# Patient Record
Sex: FEMALE | Race: WHITE | NOT HISPANIC OR LATINO | Employment: PART TIME | URBAN - METROPOLITAN AREA
[De-identification: names, ages, dates, MRNs, and addresses within clinical notes are randomized per-mention and may not be internally consistent; named-entity substitution may affect disease eponyms.]

---

## 2021-05-31 ENCOUNTER — RECORDS - HEALTHEAST (OUTPATIENT)
Dept: ADMINISTRATIVE | Facility: CLINIC | Age: 51
End: 2021-05-31

## 2021-06-04 ENCOUNTER — RECORDS - HEALTHEAST (OUTPATIENT)
Dept: ADMINISTRATIVE | Facility: CLINIC | Age: 51
End: 2021-06-04

## 2021-09-01 ENCOUNTER — TRANSFERRED RECORDS (OUTPATIENT)
Dept: HEALTH INFORMATION MANAGEMENT | Facility: CLINIC | Age: 51
End: 2021-09-01

## 2021-09-27 NOTE — TELEPHONE ENCOUNTER
RECORDS RECEIVED FROM: [facility]   Appt Date: 09.30.2021   NOTES STATUS DETAILS   OFFICE NOTE from referring provider N/A    OFFICE NOTES from other specialists [facility] 08.26.2021 Kavya Lucas MD     DISCHARGE SUMMARY from hospital N/A    MEDICATION LIST [facility]    LIVER BIOSPY (IF APPLICABLE)      PATHOLOGY REPORTS  N/A    IMAGING     ENDOSCOPY (IF AVAILABLE) N/A    COLONOSCOPY (IF AVAILABLE) [facility] 11.20.2020   ULTRASOUND LIVER N/A    CT OF ABDOMEN N/A    MRI OF LIVER [facility] 09.06.2021 MR ABD W/WO IV CONT   FIBROSCAN, US ELASTOGRAPHY, FIBROSIS SCAN, MR ELASTOGRAPHY N/A    LABS     HEPATIC PANEL (LIVER PANEL) N/A    BASIC METABOLIC PANEL N/A    COMPLETE METABOLIC PANEL Internal 04.02.2019   COMPLETE BLOOD COUNT (CBC) Internal 04.02.2019   INTERNATIONAL NORMALIZED RATIO (INR) N/A    HEPATITIS C ANTIBODY N/A    HEPATITIS C VIRAL LOAD/PCR N/A    HEPATITIS C GENOTYPE N/A    HEPATITIS B SURFACE ANTIGEN N/A    HEPATITIS B SURFACE ANTIBODY N/A    HEPATITIS B DNA QUANT LEVEL N/A    HEPATITIS B CORE ANTIBODY N/A

## 2021-09-30 ENCOUNTER — PRE VISIT (OUTPATIENT)
Dept: GASTROENTEROLOGY | Facility: CLINIC | Age: 51
End: 2021-09-30

## 2021-09-30 ENCOUNTER — VIRTUAL VISIT (OUTPATIENT)
Dept: GASTROENTEROLOGY | Facility: CLINIC | Age: 51
End: 2021-09-30
Attending: INTERNAL MEDICINE
Payer: COMMERCIAL

## 2021-09-30 DIAGNOSIS — K76.0 FATTY LIVER: ICD-10-CM

## 2021-09-30 DIAGNOSIS — R16.0 LIVER MASS: Primary | ICD-10-CM

## 2021-09-30 PROCEDURE — 99204 OFFICE O/P NEW MOD 45 MIN: CPT | Mod: GT | Performed by: INTERNAL MEDICINE

## 2021-09-30 RX ORDER — LANCETS 30 GAUGE
1 EACH MISCELLANEOUS
COMMUNITY
Start: 2021-08-26 | End: 2021-09-30

## 2021-09-30 RX ORDER — BLOOD SUGAR DIAGNOSTIC
STRIP MISCELLANEOUS
COMMUNITY
Start: 2021-08-26 | End: 2021-09-30

## 2021-09-30 RX ORDER — BUSPIRONE HYDROCHLORIDE 5 MG/1
TABLET ORAL
COMMUNITY

## 2021-09-30 RX ORDER — BLOOD-GLUCOSE METER
EACH MISCELLANEOUS
COMMUNITY
Start: 2021-08-26 | End: 2021-09-30

## 2021-09-30 RX ORDER — GABAPENTIN 100 MG/1
CAPSULE ORAL
COMMUNITY

## 2021-09-30 RX ORDER — FLUOXETINE 10 MG/1
CAPSULE ORAL
COMMUNITY

## 2021-09-30 NOTE — LETTER
9/30/2021         RE: Srini Dunn  [address] [place] [address] [place] [address]        Dear Colleague,    Thank you for referring your patient, Srini Dunn, to the [facility] [place]. Please see a copy of my visit note below.    Srini is a 51 year old who is being evaluated via a billable video visit.      How would you like to obtain your AVS? MyChart  If the video visit is dropped, the invitation should be resent by: Text to cell phone: [phone]  Will anyone else be joining your video visit? No    Video Start Time: 0930  Video-Visit Details    Type of service:  Video Visit    Video End Time:0950    Originating Location (pt. Location): Home    Distant Location (provider location):  [facility] [place]     Platform used for Video Visit: [employer]    Date of Service: 9/30/2021     Referring Provider: Kavya Lucas    Subjective:            Srini Dunn is a 51 year old female presenting for evaluation of liver mass/abnormal imaging    History of Present Illness   Srini Dunn is a 51 year old female with past medical history of mild obesity, history of breast cancer in 2008, mild depression who presents with concerns of incidentally noted liver mass and fatty liver disease.    She was having issues with decreased energy over the last couple of years and has had documented episodes of hypoglycemia over the past 2 years.  Some of these been associated with altered mental status.  She establish care with endocrinology in late 2021 and underwent a CT scan as part of an evaluation for an insulinoma.  This exam incidentally demonstrated a liver mass and the patient underwent a dedicated MRI of the abdomen with and without contrast on September 3.  This demonstrated marked fatty infiltration of the liver in addition to a 3.5 cm segment 4B lesion.  Based on characteristics with imaging this was read as likely FNH versus adenoma.  Her imaging  was reviewed via telemetry consult with gastroenterology at [facility], and plan was for repeat imaging versus EUS evaluation.  The patient ultimately requested consultation with our group.    Denies a significant history of alcohol consumption.  Denies history of current nicotine use and denies a history of IV inhaled drug use.  She is not very physically active, with the majority of her activity walking her small dogs.  Denies an overt family history of liver disease but has questions of her father had liver disease and does note that her father has diabetes.    She was diagnosed with breast cancer in her left breast around 2008 underwent lumpectomy and was on tamoxifen for 5 years after    Past Medical History:  Depression  Obesity  Fatty liver disease  History of breast cancer    Surgical History:  MIKEY/BSO  Cholecystectomy    Social History:  Social History     Tobacco Use     Smoking status: Not on file   Substance Use Topics     Alcohol use: Not on file     Drug use: Not on file       Family History:  No family history on file.    Medications:  Current Outpatient Medications   Medication     busPIRone (BUSPAR) 5 MG tablet     FLUoxetine (PROZAC) 10 MG capsule     gabapentin (NEURONTIN) 100 MG capsule     No current facility-administered medications for this visit.       Review of Systems  A complete 10 point review of systems was asked and answered in the negative unless specifically commented upon in the HPI    Objective:         There were no vitals filed for this visit.  There is no height or weight on file to calculate BMI.     Physical Exam  Constitutional: Well-developed, well-nourished, in no apparent distress.  HEENT: Normocephalic. no scleral icterus.   Neck/Lymph: Normal ROM  Cardiac:  Regular rate  Skin:  No jaundice.   Musculoskeletal:  ROM intact, normal muscle bulk    Psychiatric: Normal mood and affect. Behavior is normal.  Neuro: no tremor    Labs and Diagnostic tests:  We do not have her  recent lab results    Imaging:  MRI w/wo 9/6/2021  FINDINGS: The liver demonstrates marked fatty infiltration and there is significant drop in signal on out of phase imaging. There is of a lesion seen in segment 4B of the liver on series 2.1: Image 16 measuring 3.5 x 2.7 cm. This is isointense to liver on T2-weighted scans and on the in phase T1-weighted scans. This is higher signal on the fat-suppressed T1-weighted precontrast imaging. Postcontrast there is misregistration on the subtraction imaging which makes it difficult to determine definitively whether there is any enhancement present. No evidence of restricted diffusion. On the haste imaging this is low in signal. There is a small focus of increased signal within the lesion on the T2 haste imaging series 5: Image 18 which corresponds to a small focus which dropped in signal on the out of phase imaging and could be a small focus of gross fat within the lesion.     Adenomas can have gross fat and thus this lesion could represent an atypical adenoma. Alternatively given the isointensity to the remainder of the liver on the unenhanced T1-weighted and T2-weighted scans an atypical FNH is also a possibility. Given that there is no evidence of restricted diffusion, malignancy is very unlikely. Consider follow-up exam in 3-6 months to establish stability and also consider repeat examination with Eovist in order to evaluate for focal nodular hyperplasia.     The spleen, pancreas and adrenal glands are unremarkable. Kidneys demonstrate no hydronephrosis. No renal mass. The pancreas, spleen and adrenal glands are unremarkable. Kidneys demonstrate no hydronephrosis.     IMPRESSION:   1.  There is a mass lesion in segment IVb which corresponds to the abnormality seen on recent CT. The liver is diffusely extremely fatty infiltrated and this could be a focal area of fatty sparing. That said it is difficult to be certain that there is not enhancement within the lesion due  to misregistration on the subtraction imaging. This could also represent an atypical adenoma or FNH. Malignancy is unlikely given that there is no evidence of restricted diffusion. Consider repeat exam with Eovist in order to determine whether this is an atypical FNH. Also recommend short interval follow-up in 3-6 months given that the degree of enhancement is uncertain based on misregistration on the subtraction images.    Other Result Text    Silva Woodward MD - 09/06/2021   Formatting of this note might be different from the original.   EXAM: MR ABD W/WO IV CONT   LOCATION: [facility]   DATE/TIME: 9/3/2021 4:11 PM     INDICATION: Hepatic mass seen on prior CT. Further evaluation requested.   COMPARISON: CT abdomen and pelvis 08/05/2021   TECHNIQUE: Routine MRI abdomen protocol including T1 in/out phase, diffusion, multiplane T2, and dynamic T1 with IV contrast.   CONTRAST: GADOXETATE DISODIUM 0.25 MOL/L IV SOLN 8 mL     FINDINGS: The liver demonstrates marked fatty infiltration and there is significant drop in signal on out of phase imaging. There is of a lesion seen in segment 4B of the liver on series 2.1: Image 16 measuring 3.5 x 2.7 cm. This is isointense to liver on T2-weighted scans and on the in phase T1-weighted scans. This is higher signal on the fat-suppressed T1-weighted precontrast imaging. Postcontrast there is misregistration on the subtraction imaging which makes it difficult to determine definitively whether there is any enhancement present. No evidence of restricted diffusion. On the haste imaging this is low in signal. There is a small focus of increased signal within the lesion on the T2 haste imaging series 5: Image 18 which corresponds to a small focus which dropped in signal on the out of phase imaging and could be a small focus of gross fat within the lesion.     Adenomas can have gross fat and thus this lesion could represent an atypical adenoma. Alternatively given the  isointensity to the remainder of the liver on the unenhanced T1-weighted and T2-weighted scans an atypical FNH is also a possibility. Given that there is no evidence of restricted diffusion, malignancy is very unlikely. Consider follow-up exam in 3-6 months to establish stability and also consider repeat examination with Eovist in order to evaluate for focal nodular hyperplasia.     The spleen, pancreas and adrenal glands are unremarkable. Kidneys demonstrate no hydronephrosis. No renal mass. The pancreas, spleen and adrenal glands are unremarkable. Kidneys demonstrate no hydronephrosis.       Assessment and Plan:    Abnormal Liver Imaging:    - She does not have significant risk factors for chronic liver disease, although she does have imaging evidence of fatty liver disease  - Seg 4B lesion appears most likely an FNH vs adenoma: will review in multi-disciplinary board for further review and plan    Non-Alcoholic Fatty Liver Disease  - I had a long discussion with the patient about nonalcoholic fatty liver disease (NAFLD).  We discussed how fat deposits in the liver, how this leads to inflammation, and how chronic inflammation (RUDOLPH) can ultimately lead to scarring and cirrhosis.  The long-term complications of fatty liver disease were discussed with the patient, including the increased risk of cardiovascular disease complications,the risk of developing diabetes (if not already), and variable progression to cirrhosis and end stage liver disease.  - It is important to note that liver tests alone as a screening test for RUDOLPH are not sensitive, as up to 20-30% of patients can have RUDOLPH with fibrosis despite having normal liver chemistries.     Management of NAFLD/RUDOLPH  - We spent time discussing an appropriate diet, exercise and weight loss plan.      - Recommend exercise regularly: 4+ times per week, with an average of about 45 minutes per day.      - It has shown that patients who exercise regularly can have  "improvement of insulin resistance and resolution of fatty liver disease, even if they are not able to lose weight.     - Recommend a low-carbohydrate, low-calorie diet (6393-6923 calories per day).    - An ideal weight loss plan would be to lose 7-10% of body weight over the next six months  - Recommend aggressive diabetes management: ideal goal Hgb A1c goal of =/< 7%. If possible addition of insulin sensitizing agents like metformin or liraglutide will be helpful.    - Management of cholesterol is also very important.    - The use of \"statins\" (HMG-CoA reductase medications) are an effective means of therapy and are not contra-indicated in those with abnormal liver tests OR those with cirrhosis.  The value of these medications in this population far outweigh the minor risks of abnormal liver tests.   - Goal LDL in those with RUDOLPH are < 100 mg/dL  - Consider the utility of liberalizing coffee consumption as some data that this may slow progression and reverse effects of RUDOLPH-related fibrosis.    Follow Up:  6 months     Thank you very much for the opportunity to participate in the care of this patient.  If you have any further questions, please don't hesitate to contact our office.    Thomas M. Leventhal, M.D.   of Medicine  Advanced & Transplant Hepatology  [facility]        Again, thank you for allowing me to participate in the care of your patient.        Sincerely,        Thomas M. Leventhal, MD    "

## 2021-09-30 NOTE — PROGRESS NOTES
Srini is a 51 year old who is being evaluated via a billable video visit.      How would you like to obtain your AVS? MyChart  If the video visit is dropped, the invitation should be resent by: Text to cell phone: [phone]  Will anyone else be joining your video visit? No    Video Start Time: 0930  Video-Visit Details    Type of service:  Video Visit    Video End Time:0950    Originating Location (pt. Location): Home    Distant Location (provider location):  [facility] [place]     Platform used for Video Visit: [employer]    Date of Service: 9/30/2021     Referring Provider: Kavya Lucas    Subjective:            Srini Dunn is a 51 year old female presenting for evaluation of liver mass/abnormal imaging    History of Present Illness   Srini Dunn is a 51 year old female with past medical history of mild obesity, history of breast cancer in 2008, mild depression who presents with concerns of incidentally noted liver mass and fatty liver disease.    She was having issues with decreased energy over the last couple of years and has had documented episodes of hypoglycemia over the past 2 years.  Some of these been associated with altered mental status.  She establish care with endocrinology in late 2021 and underwent a CT scan as part of an evaluation for an insulinoma.  This exam incidentally demonstrated a liver mass and the patient underwent a dedicated MRI of the abdomen with and without contrast on September 3.  This demonstrated marked fatty infiltration of the liver in addition to a 3.5 cm segment 4B lesion.  Based on characteristics with imaging this was read as likely FNH versus adenoma.  Her imaging was reviewed via telemetry consult with gastroenterology at [facility], and plan was for repeat imaging versus EUS evaluation.  The patient ultimately requested consultation with our group.    Denies a significant history of alcohol consumption.  Denies history of current  nicotine use and denies a history of IV inhaled drug use.  She is not very physically active, with the majority of her activity walking her small dogs.  Denies an overt family history of liver disease but has questions of her father had liver disease and does note that her father has diabetes.    She was diagnosed with breast cancer in her left breast around 2008 underwent lumpectomy and was on tamoxifen for 5 years after    Past Medical History:  Depression  Obesity  Fatty liver disease  History of breast cancer    Surgical History:  MIKEY/BSO  Cholecystectomy    Social History:  Social History     Tobacco Use     Smoking status: Not on file   Substance Use Topics     Alcohol use: Not on file     Drug use: Not on file       Family History:  No family history on file.    Medications:  Current Outpatient Medications   Medication     busPIRone (BUSPAR) 5 MG tablet     FLUoxetine (PROZAC) 10 MG capsule     gabapentin (NEURONTIN) 100 MG capsule     No current facility-administered medications for this visit.       Review of Systems  A complete 10 point review of systems was asked and answered in the negative unless specifically commented upon in the HPI    Objective:         There were no vitals filed for this visit.  There is no height or weight on file to calculate BMI.     Physical Exam  Constitutional: Well-developed, well-nourished, in no apparent distress.  HEENT: Normocephalic. no scleral icterus.   Neck/Lymph: Normal ROM  Cardiac:  Regular rate  Skin:  No jaundice.   Musculoskeletal:  ROM intact, normal muscle bulk    Psychiatric: Normal mood and affect. Behavior is normal.  Neuro: no tremor    Labs and Diagnostic tests:  We do not have her recent lab results    Imaging:  MRI w/wo 9/6/2021  FINDINGS: The liver demonstrates marked fatty infiltration and there is significant drop in signal on out of phase imaging. There is of a lesion seen in segment 4B of the liver on series 2.1: Image 16 measuring 3.5 x 2.7 cm.  This is isointense to liver on T2-weighted scans and on the in phase T1-weighted scans. This is higher signal on the fat-suppressed T1-weighted precontrast imaging. Postcontrast there is misregistration on the subtraction imaging which makes it difficult to determine definitively whether there is any enhancement present. No evidence of restricted diffusion. On the haste imaging this is low in signal. There is a small focus of increased signal within the lesion on the T2 haste imaging series 5: Image 18 which corresponds to a small focus which dropped in signal on the out of phase imaging and could be a small focus of gross fat within the lesion.     Adenomas can have gross fat and thus this lesion could represent an atypical adenoma. Alternatively given the isointensity to the remainder of the liver on the unenhanced T1-weighted and T2-weighted scans an atypical FNH is also a possibility. Given that there is no evidence of restricted diffusion, malignancy is very unlikely. Consider follow-up exam in 3-6 months to establish stability and also consider repeat examination with Eovist in order to evaluate for focal nodular hyperplasia.     The spleen, pancreas and adrenal glands are unremarkable. Kidneys demonstrate no hydronephrosis. No renal mass. The pancreas, spleen and adrenal glands are unremarkable. Kidneys demonstrate no hydronephrosis.     IMPRESSION:   1.  There is a mass lesion in segment IVb which corresponds to the abnormality seen on recent CT. The liver is diffusely extremely fatty infiltrated and this could be a focal area of fatty sparing. That said it is difficult to be certain that there is not enhancement within the lesion due to misregistration on the subtraction imaging. This could also represent an atypical adenoma or FNH. Malignancy is unlikely given that there is no evidence of restricted diffusion. Consider repeat exam with Eovist in order to determine whether this is an atypical FNH. Also  recommend short interval follow-up in 3-6 months given that the degree of enhancement is uncertain based on misregistration on the subtraction images.    Other Result Text    Silva Woodward MD - 09/06/2021   Formatting of this note might be different from the original.   EXAM: MR ABD W/WO IV CONT   LOCATION: [facility]   DATE/TIME: 9/3/2021 4:11 PM     INDICATION: Hepatic mass seen on prior CT. Further evaluation requested.   COMPARISON: CT abdomen and pelvis 08/05/2021   TECHNIQUE: Routine MRI abdomen protocol including T1 in/out phase, diffusion, multiplane T2, and dynamic T1 with IV contrast.   CONTRAST: GADOXETATE DISODIUM 0.25 MOL/L IV SOLN 8 mL     FINDINGS: The liver demonstrates marked fatty infiltration and there is significant drop in signal on out of phase imaging. There is of a lesion seen in segment 4B of the liver on series 2.1: Image 16 measuring 3.5 x 2.7 cm. This is isointense to liver on T2-weighted scans and on the in phase T1-weighted scans. This is higher signal on the fat-suppressed T1-weighted precontrast imaging. Postcontrast there is misregistration on the subtraction imaging which makes it difficult to determine definitively whether there is any enhancement present. No evidence of restricted diffusion. On the haste imaging this is low in signal. There is a small focus of increased signal within the lesion on the T2 haste imaging series 5: Image 18 which corresponds to a small focus which dropped in signal on the out of phase imaging and could be a small focus of gross fat within the lesion.     Adenomas can have gross fat and thus this lesion could represent an atypical adenoma. Alternatively given the isointensity to the remainder of the liver on the unenhanced T1-weighted and T2-weighted scans an atypical FNH is also a possibility. Given that there is no evidence of restricted diffusion, malignancy is very unlikely. Consider follow-up exam in 3-6 months to establish stability and  also consider repeat examination with Eovist in order to evaluate for focal nodular hyperplasia.     The spleen, pancreas and adrenal glands are unremarkable. Kidneys demonstrate no hydronephrosis. No renal mass. The pancreas, spleen and adrenal glands are unremarkable. Kidneys demonstrate no hydronephrosis.       Assessment and Plan:    Abnormal Liver Imaging:    - She does not have significant risk factors for chronic liver disease, although she does have imaging evidence of fatty liver disease  - Seg 4B lesion appears most likely an FNH vs adenoma: will review in multi-disciplinary board for further review and plan    Non-Alcoholic Fatty Liver Disease  - I had a long discussion with the patient about nonalcoholic fatty liver disease (NAFLD).  We discussed how fat deposits in the liver, how this leads to inflammation, and how chronic inflammation (RUDOLPH) can ultimately lead to scarring and cirrhosis.  The long-term complications of fatty liver disease were discussed with the patient, including the increased risk of cardiovascular disease complications,the risk of developing diabetes (if not already), and variable progression to cirrhosis and end stage liver disease.  - It is important to note that liver tests alone as a screening test for RUDOLPH are not sensitive, as up to 20-30% of patients can have RUDOLPH with fibrosis despite having normal liver chemistries.     Management of NAFLD/RUDOLPH  - We spent time discussing an appropriate diet, exercise and weight loss plan.      - Recommend exercise regularly: 4+ times per week, with an average of about 45 minutes per day.      - It has shown that patients who exercise regularly can have improvement of insulin resistance and resolution of fatty liver disease, even if they are not able to lose weight.     - Recommend a low-carbohydrate, low-calorie diet (9217-2389 calories per day).    - An ideal weight loss plan would be to lose 7-10% of body weight over the next six  "months  - Recommend aggressive diabetes management: ideal goal Hgb A1c goal of =/< 7%. If possible addition of insulin sensitizing agents like metformin or liraglutide will be helpful.    - Management of cholesterol is also very important.    - The use of \"statins\" (HMG-CoA reductase medications) are an effective means of therapy and are not contra-indicated in those with abnormal liver tests OR those with cirrhosis.  The value of these medications in this population far outweigh the minor risks of abnormal liver tests.   - Goal LDL in those with RUDOLPH are < 100 mg/dL  - Consider the utility of liberalizing coffee consumption as some data that this may slow progression and reverse effects of RUDOLPH-related fibrosis.    Follow Up:  6 months     Thank you very much for the opportunity to participate in the care of this patient.  If you have any further questions, please don't hesitate to contact our office.    Thomas M. Leventhal, M.D.   of Medicine  Advanced & Transplant Hepatology  [facility]    "

## 2021-10-01 ENCOUNTER — DOCUMENTATION ONLY (OUTPATIENT)
Dept: GASTROENTEROLOGY | Facility: CLINIC | Age: 51
End: 2021-10-01

## 2021-10-01 PROBLEM — K76.0 FATTY LIVER: Status: ACTIVE | Noted: 2021-10-01

## 2021-10-01 PROBLEM — R16.0 LIVER MASS: Status: ACTIVE | Noted: 2021-10-01

## 2021-10-01 NOTE — PROGRESS NOTES
"Reviewed MRI from 9/3/2021    Noted the following  - the patient has significant fatty infiltration of liver  - The \"mass lesion of concern\" has bile ducts and blood vessels coursing through, suggesting against mass lesion and rather an area of focal fatty sparing.    RECS  - Continue with lifestyle modifications as we discussed  - No need for further follow up with routine imaging.    TML  "

## 2021-10-12 ENCOUNTER — MYC MEDICAL ADVICE (OUTPATIENT)
Dept: GASTROENTEROLOGY | Facility: CLINIC | Age: 51
End: 2021-10-12

## 2021-10-16 ENCOUNTER — HEALTH MAINTENANCE LETTER (OUTPATIENT)
Age: 51
End: 2021-10-16

## 2022-07-27 ENCOUNTER — OFFICE VISIT (OUTPATIENT)
Dept: UROLOGY | Facility: CLINIC | Age: 52
End: 2022-07-27
Payer: COMMERCIAL

## 2022-07-27 VITALS
WEIGHT: 176 LBS | HEIGHT: 67 IN | SYSTOLIC BLOOD PRESSURE: 128 MMHG | DIASTOLIC BLOOD PRESSURE: 70 MMHG | BODY MASS INDEX: 27.62 KG/M2

## 2022-07-27 DIAGNOSIS — N39.0 RECURRENT UTI: Primary | ICD-10-CM

## 2022-07-27 PROCEDURE — 99204 OFFICE O/P NEW MOD 45 MIN: CPT | Performed by: OBSTETRICS & GYNECOLOGY

## 2022-07-27 RX ORDER — NITROFURANTOIN 25; 75 MG/1; MG/1
100 CAPSULE ORAL DAILY
Qty: 90 CAPSULE | Refills: 1 | Status: SHIPPED | OUTPATIENT
Start: 2022-07-27

## 2022-07-27 NOTE — PROGRESS NOTES
July 27, 2022    Referring Provider: No referring provider defined for this encounter.    Primary Care Provider: Brianne Hartmann    CC: recurrent UTIs    HPI:  Srini Dunn is a 52 year old female who presents for evaluation of her pelvic floor symptoms.  She has a h/o of recurrent UTIs with 2 in the past 3 months,    6/21/22 - 50-100k CFU E. Coli  7/21/22 - >100KCFU E. Coli.    It was originally thought that the UTIs were secondary to kidney stones. She was treated by her urologist and the stones were removed. It was noted that she had a urethral stricture at that time.     She has a h/o breast cancer that is estrogen sensitive and is unable to take estrogen.       Prolapse:  Do you feel a vaginal bulge? no                                      Pressure? no   Do you have to place your fingers in the vagina or in the rectum to have a bowel movement? no  Impact to quality of life? -     Stress Incontinence:  Do you leak urine with cough, sneeze, exercise? yes  How often do you leak with cough, sneeze, exercise?  occ  How much do you usually leak? soak   Do you wear a pad? no If so; -  Impact to quality of life? minimal    Urge Incontinence:  Do you often get sudden urges to urinate? yes  How often do have urges? occ  If so, do you leak with these urges? yes  How much do you usually leak? More than drops  Impact to quality of life? minimal    Voids/day:6  Nocturia: 3-4  Fluid intake: 4-5  Caffeine: 2    Urinating:  Difficulty starting urination or strain to void? no  Weak or intermittent stream? no  Incomplete emptying or dribbling? yes  Pain or burning with urination? no  Any blood in your urine? no    GI:  Constipation? no  Frequency stools daily    Straining for stools no  Stool consistency normal     Ever leak stool (Accidental Bowel Leakage)? no      If so, how often?               -      If so, do you leak?                   -      Soiling without sensation? -  History of irritable bowel or Crohn's?  -    Sexual/Pain:  Are you currently having sex?. no  Pain with sex?   -   Sexual Partner: -  Do any of these symptoms interfere with sex? -  Impact to quality of life? -    Prior therapy:  Ever done pelvic floor physical therapy? no  Trial of medication? -  Have you ever tried a pessary? no    Medical History:  Do you have?   High Cholesterol? no     Diabetes? no  High Blood pressure? no     Recurrent UTIs? yes  Sleep Apnea? no  Other medical problems: breast cancer    Surgical History:    Hysterectomy? 2011   Bladder Surgery? no   Other? Left mastectomy      OB/Gyn History:  Pregnancies? 2  Deliveries? 2  Vaginal 2  Section 0  Current birth control? -  Periods? -  When was the first day of your last period? -  Last Pap smear? - Any abnormal? -  Last mammogram? -  Last colonoscopy? -    Medications/Vitamins/Supplements: reviewed    Drug Allergies: reviewed    Latex Allergy: no  Iodine Allergy no    Family History: (list relationship and age at diagnosis)  Breast cancer? no   Ovarian cancer? no   Colon cancer? uncle  Other? no    Social History:  Marital status:   Do you/ have you ever smoke(d)  cigarettes? yes  Drink more than 1 alcoholic beverage a day?  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 no                   Unusual headaches? no  Leg pain w/ walking/exercise? no                       Easy bruising? no  Difficulty breathing w/ walking/exercise? no  Problems with vision? no  Dizziness, falls, or fainting? no  Excessive bleeding from cuts, gums, surgery? no  Other: no    No past medical history on file.    No past surgical history on file.    Social History     Socioeconomic History     Marital status:      Spouse name: Not on file     Number of children: Not on file     Years of education: Not on file     Highest education level: Not on file   Occupational History     Not on file   Tobacco Use      "Smoking status: Light Tobacco Smoker     Smokeless tobacco: Current User     Tobacco comment: socially   Substance and Sexual Activity     Alcohol use: Not on file     Drug use: Not on file     Sexual activity: Not on file   Other Topics Concern     Not on file   Social History Narrative     Not on file     Social Determinants of Health     Financial Resource Strain: Not on file   Food Insecurity: Not on file   Transportation Needs: Not on file   Physical Activity: Not on file   Stress: Not on file   Social Connections: Not on file   Intimate Partner Violence: Not on file   Housing Stability: Not on file       No family history on file.    ROS    Allergies   Allergen Reactions     Morphine Other (See Comments) and GI Disturbance     Abdominal pain         Current Outpatient Medications   Medication     busPIRone (BUSPAR) 5 MG tablet     FLUoxetine (PROZAC) 10 MG capsule     FLUoxetine (PROZAC) 20 MG capsule     gabapentin (NEURONTIN) 100 MG capsule     nitroFURantoin macrocrystal-monohydrate (MACROBID) 100 MG capsule     No current facility-administered medications for this visit.       /70   Ht 1.702 m (5' 7\")   Wt 79.8 kg (176 lb)   BMI 27.57 kg/m   No LMP recorded. Patient has had a hysterectomy. Body mass index is 27.57 kg/m .  Ms. Dunn is alert, comfortable in no acute distress, non-labored breathing.     A/P: Srini Dunn is a 52 year old F with recurrent UTIs    We discussed treatment options. Pt has already had CT and cystoscopy. Will start macrobid prophylaxis. Advised to start D-Mannose 500mg twice a day. F/U in 2-3 months or sooner PRN    I spent a total of 45 minutes with  Ms. Dunn  on the date of the encounter in chart review, face to face patient visit, review of tests, documentation and/or discussion with other providers about the issues documented above.    Kye Escobar MD  Professor, OB/GYN  Urogynecologist  CC  Patient Care Team:  Brianne Hartmann MD as PCP - General (Family " Medicine)  Leventhal, Thomas Michael, MD as MD (Gastroenterology)  Leventhal, Thomas Michael, MD as Assigned Gastroenterology Provider

## 2022-07-27 NOTE — PATIENT INSTRUCTIONS
Thank you for coming to see me today. I hope that I was able to answer your questions. Please do not hesitate to call if you have any further questions or concerns.     We discussed the possible cause of your symptoms and what to do to prevent a recurrent bladder infection.   Supplements to prevent UTI to consider  -Probiotics  -Cranberry (for these products let them know a doctor is recommending them)   Ellura: [email or web address]   Theracran HP by [employer] PRC [id number]  -d-mannose 500mg twice a day  -Vitamin C 500-1000mg twice a day

## 2022-10-01 ENCOUNTER — HEALTH MAINTENANCE LETTER (OUTPATIENT)
Age: 52
End: 2022-10-01

## 2023-02-04 ENCOUNTER — HEALTH MAINTENANCE LETTER (OUTPATIENT)
Age: 53
End: 2023-02-04

## 2024-03-03 ENCOUNTER — HEALTH MAINTENANCE LETTER (OUTPATIENT)
Age: 54
End: 2024-03-03

## 2025-02-15 ENCOUNTER — HEALTH MAINTENANCE LETTER (OUTPATIENT)
Age: 55
End: 2025-02-15

## 2025-03-15 ENCOUNTER — HEALTH MAINTENANCE LETTER (OUTPATIENT)
Age: 55
End: 2025-03-15